# Patient Record
Sex: FEMALE | Race: WHITE | NOT HISPANIC OR LATINO
[De-identification: names, ages, dates, MRNs, and addresses within clinical notes are randomized per-mention and may not be internally consistent; named-entity substitution may affect disease eponyms.]

---

## 2020-01-07 ENCOUNTER — TRANSCRIPTION ENCOUNTER (OUTPATIENT)
Age: 80
End: 2020-01-07

## 2020-01-24 ENCOUNTER — OUTPATIENT (OUTPATIENT)
Dept: OUTPATIENT SERVICES | Facility: HOSPITAL | Age: 80
LOS: 1 days | End: 2020-01-24
Payer: MEDICARE

## 2020-01-24 VITALS
SYSTOLIC BLOOD PRESSURE: 132 MMHG | HEART RATE: 82 BPM | RESPIRATION RATE: 16 BRPM | DIASTOLIC BLOOD PRESSURE: 64 MMHG | OXYGEN SATURATION: 97 % | HEIGHT: 64 IN | WEIGHT: 179.9 LBS | TEMPERATURE: 98 F

## 2020-01-24 DIAGNOSIS — C50.411 MALIGNANT NEOPLASM OF UPPER-OUTER QUADRANT OF RIGHT FEMALE BREAST: ICD-10-CM

## 2020-01-24 DIAGNOSIS — C50.419 MALIGNANT NEOPLASM OF UPPER-OUTER QUADRANT OF UNSPECIFIED FEMALE BREAST: ICD-10-CM

## 2020-01-24 DIAGNOSIS — Z98.41 CATARACT EXTRACTION STATUS, RIGHT EYE: Chronic | ICD-10-CM

## 2020-01-24 DIAGNOSIS — Z90.710 ACQUIRED ABSENCE OF BOTH CERVIX AND UTERUS: Chronic | ICD-10-CM

## 2020-01-24 DIAGNOSIS — C49.A2 GASTROINTESTINAL STROMAL TUMOR OF STOMACH: Chronic | ICD-10-CM

## 2020-01-24 LAB
ANION GAP SERPL CALC-SCNC: 16 MMO/L — HIGH (ref 7–14)
BUN SERPL-MCNC: 22 MG/DL — SIGNIFICANT CHANGE UP (ref 7–23)
CALCIUM SERPL-MCNC: 10.1 MG/DL — SIGNIFICANT CHANGE UP (ref 8.4–10.5)
CHLORIDE SERPL-SCNC: 88 MMOL/L — LOW (ref 98–107)
CO2 SERPL-SCNC: 25 MMOL/L — SIGNIFICANT CHANGE UP (ref 22–31)
CREAT SERPL-MCNC: 1.12 MG/DL — SIGNIFICANT CHANGE UP (ref 0.5–1.3)
GLUCOSE SERPL-MCNC: 68 MG/DL — LOW (ref 70–99)
HCT VFR BLD CALC: 38.8 % — SIGNIFICANT CHANGE UP (ref 34.5–45)
HGB BLD-MCNC: 12.4 G/DL — SIGNIFICANT CHANGE UP (ref 11.5–15.5)
MCHC RBC-ENTMCNC: 28.9 PG — SIGNIFICANT CHANGE UP (ref 27–34)
MCHC RBC-ENTMCNC: 32 % — SIGNIFICANT CHANGE UP (ref 32–36)
MCV RBC AUTO: 90.4 FL — SIGNIFICANT CHANGE UP (ref 80–100)
NRBC # FLD: 0 K/UL — SIGNIFICANT CHANGE UP (ref 0–0)
PLATELET # BLD AUTO: 337 K/UL — SIGNIFICANT CHANGE UP (ref 150–400)
PMV BLD: 9.3 FL — SIGNIFICANT CHANGE UP (ref 7–13)
POTASSIUM SERPL-MCNC: 4 MMOL/L — SIGNIFICANT CHANGE UP (ref 3.5–5.3)
POTASSIUM SERPL-SCNC: 4 MMOL/L — SIGNIFICANT CHANGE UP (ref 3.5–5.3)
RBC # BLD: 4.29 M/UL — SIGNIFICANT CHANGE UP (ref 3.8–5.2)
RBC # FLD: 13 % — SIGNIFICANT CHANGE UP (ref 10.3–14.5)
SODIUM SERPL-SCNC: 129 MMOL/L — LOW (ref 135–145)
WBC # BLD: 5.5 K/UL — SIGNIFICANT CHANGE UP (ref 3.8–10.5)
WBC # FLD AUTO: 5.5 K/UL — SIGNIFICANT CHANGE UP (ref 3.8–10.5)

## 2020-01-24 PROCEDURE — 93010 ELECTROCARDIOGRAM REPORT: CPT

## 2020-01-24 RX ORDER — ASPIRIN/CALCIUM CARB/MAGNESIUM 324 MG
1 TABLET ORAL
Qty: 0 | Refills: 0 | DISCHARGE

## 2020-01-24 RX ORDER — SODIUM CHLORIDE 9 MG/ML
3 INJECTION INTRAMUSCULAR; INTRAVENOUS; SUBCUTANEOUS EVERY 8 HOURS
Refills: 0 | Status: DISCONTINUED | OUTPATIENT
Start: 2020-01-31 | End: 2020-02-17

## 2020-01-24 RX ORDER — SODIUM CHLORIDE 9 MG/ML
1000 INJECTION, SOLUTION INTRAVENOUS
Refills: 0 | Status: DISCONTINUED | OUTPATIENT
Start: 2020-01-31 | End: 2020-02-17

## 2020-01-24 NOTE — H&P PST ADULT - NEGATIVE CARDIOVASCULAR SYMPTOMS
no chest pain/no orthopnea/no paroxysmal nocturnal dyspnea/no palpitations/no dyspnea on exertion/no peripheral edema/no claudication

## 2020-01-24 NOTE — H&P PST ADULT - HISTORY OF PRESENT ILLNESS
abn. mammo / US on 11/2019 showed something. S/P biopsy which showed a spot that's cacerous. S/P MRI showed benign papillomas. Pt was then refereed to surgeon by daughter. 78 y/o  female with PMH: HTN, Dyslipidemia presents to PST for pre op evaluation with hx of abn. mammo / US on 11/2019. S/P biopsy which showed "a spot that's cancerous". S/P MRI showed "benign papillomas", as per daughter. Pt was then referred to surgeon. Now scheduled for right Sono Thais  lumpectomy right sentinel lymph node biospy, excision of papilloma on 01/31/2020

## 2020-01-24 NOTE — H&P PST ADULT - ASSESSMENT
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

## 2020-01-24 NOTE — H&P PST ADULT - NEGATIVE GENERAL SYMPTOMS
no anorexia/no weight loss/no fatigue/no chills/no sweating/no weight gain/no polyphagia/no fever no weight gain/no polyphagia/no weight loss/no fever/no sweating/no anorexia/no malaise/no fatigue/no chills

## 2020-01-24 NOTE — H&P PST ADULT - NEGATIVE GASTROINTESTINAL SYMPTOMS
no hiccoughs/no melena/no vomiting/no nausea/no diarrhea/no change in bowel habits/no jaundice/no abdominal pain

## 2020-01-24 NOTE — H&P PST ADULT - NEGATIVE OPHTHALMOLOGIC SYMPTOMS
no diplopia/no discharge L/no discharge R/no photophobia no lacrimation L/no lacrimation R/no diplopia/no blurred vision L/no discharge L/no pain L/no irritation L/no scleral injection R/no discharge R/no photophobia/no blurred vision R/no pain R/no irritation R/no loss of vision L/no scleral injection L

## 2020-01-24 NOTE — H&P PST ADULT - NEGATIVE BREAST SYMPTOMS
no breast tenderness L no breast tenderness L/no breast tenderness R/no nipple discharge L/no nipple discharge R

## 2020-01-24 NOTE — H&P PST ADULT - RS GEN PE MLT RESP DETAILS PC
clear to auscultation bilaterally/no intercostal retractions/no rales/no subcutaneous emphysema/no rhonchi/no wheezes/airway patent/breath sounds equal/respirations non-labored/good air movement/no chest wall tenderness

## 2020-01-24 NOTE — H&P PST ADULT - NEGATIVE GENERAL GENITOURINARY SYMPTOMS
no hematuria/no flank pain R/no bladder infections/no urine discoloration/no dysuria/no gas in urine/normal urinary frequency/no renal colic/no flank pain L

## 2020-01-24 NOTE — H&P PST ADULT - NEGATIVE NEUROLOGICAL SYMPTOMS
no hemiparesis/no facial palsy/no transient paralysis/no weakness/no generalized seizures/no tremors/no vertigo/no loss of sensation/no headache/no paresthesias/no difficulty walking/no loss of consciousness/no confusion

## 2020-01-24 NOTE — H&P PST ADULT - NEGATIVE ENMT SYMPTOMS
no nasal congestion/no nasal obstruction/no post-nasal discharge/no nose bleeds/no abnormal taste sensation/no dry mouth/no vertigo/no sinus symptoms/no ear pain/no recurrent cold sores/no tinnitus/no throat pain/no dysphagia/no hearing difficulty

## 2020-01-24 NOTE — H&P PST ADULT - NSICDXPASTSURGICALHX_GEN_ALL_CORE_FT
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

## 2020-01-24 NOTE — H&P PST ADULT - NSICDXPASTMEDICALHX_GEN_ALL_CORE_FT
PAST MEDICAL HISTORY:  2010  endometrial malignancy/Cancer     Abdominal Mass     Fibroid     History of Colonoscopy     Hypercholesterolemia     Hypertension     S/P Endoscopy

## 2020-01-24 NOTE — H&P PST ADULT - BREASTS DETAILS
no discharge or discoloration/mass R/nipples normal/no tenderness/right breast ecchymotic areas noted

## 2020-01-24 NOTE — H&P PST ADULT - NSICDXPROBLEM_GEN_ALL_CORE_FT
PROBLEM DIAGNOSES  Problem: Malignant neoplasm of upper-outer quadrant of female breast  Assessment and Plan: Scheduled for right Sono Saviscout lumpectomy, right sentinel lymph node biospy, excision of papilloma on 01/31/2020. Pre op instructions, famotidine, chlorhexidine gluconate soap given and explained,   Pending medical consultation pre op

## 2020-01-29 ENCOUNTER — APPOINTMENT (OUTPATIENT)
Dept: MAMMOGRAPHY | Facility: IMAGING CENTER | Age: 80
End: 2020-01-29
Payer: MEDICARE

## 2020-01-29 ENCOUNTER — OUTPATIENT (OUTPATIENT)
Dept: OUTPATIENT SERVICES | Facility: HOSPITAL | Age: 80
LOS: 1 days | End: 2020-01-29
Payer: MEDICARE

## 2020-01-29 ENCOUNTER — APPOINTMENT (OUTPATIENT)
Dept: ULTRASOUND IMAGING | Facility: IMAGING CENTER | Age: 80
End: 2020-01-29
Payer: MEDICARE

## 2020-01-29 DIAGNOSIS — Z00.8 ENCOUNTER FOR OTHER GENERAL EXAMINATION: ICD-10-CM

## 2020-01-29 DIAGNOSIS — Z90.710 ACQUIRED ABSENCE OF BOTH CERVIX AND UTERUS: Chronic | ICD-10-CM

## 2020-01-29 DIAGNOSIS — C49.A2 GASTROINTESTINAL STROMAL TUMOR OF STOMACH: Chronic | ICD-10-CM

## 2020-01-29 DIAGNOSIS — Z98.41 CATARACT EXTRACTION STATUS, RIGHT EYE: Chronic | ICD-10-CM

## 2020-01-29 PROCEDURE — 19282 PERQ DEVICE BREAST EA IMAG: CPT | Mod: RT

## 2020-01-29 PROCEDURE — 19285 PERQ DEV BREAST 1ST US IMAG: CPT | Mod: 59,RT

## 2020-01-29 PROCEDURE — 19285 PERQ DEV BREAST 1ST US IMAG: CPT

## 2020-01-29 PROCEDURE — 19281 PERQ DEVICE BREAST 1ST IMAG: CPT | Mod: 59,RT

## 2020-01-29 PROCEDURE — 19282 PERQ DEVICE BREAST EA IMAG: CPT

## 2020-01-29 PROCEDURE — C1739: CPT

## 2020-01-29 PROCEDURE — 19281 PERQ DEVICE BREAST 1ST IMAG: CPT

## 2020-01-30 ENCOUNTER — TRANSCRIPTION ENCOUNTER (OUTPATIENT)
Age: 80
End: 2020-01-30

## 2020-01-31 ENCOUNTER — APPOINTMENT (OUTPATIENT)
Dept: NUCLEAR MEDICINE | Facility: HOSPITAL | Age: 80
End: 2020-01-31

## 2020-01-31 ENCOUNTER — RESULT REVIEW (OUTPATIENT)
Age: 80
End: 2020-01-31

## 2020-01-31 ENCOUNTER — OUTPATIENT (OUTPATIENT)
Dept: OUTPATIENT SERVICES | Facility: HOSPITAL | Age: 80
LOS: 1 days | Discharge: HOME | End: 2020-01-31
Payer: MEDICARE

## 2020-01-31 VITALS
HEART RATE: 82 BPM | RESPIRATION RATE: 16 BRPM | WEIGHT: 179.9 LBS | DIASTOLIC BLOOD PRESSURE: 64 MMHG | SYSTOLIC BLOOD PRESSURE: 132 MMHG | OXYGEN SATURATION: 97 % | TEMPERATURE: 98 F | HEIGHT: 64 IN

## 2020-01-31 VITALS
RESPIRATION RATE: 15 BRPM | SYSTOLIC BLOOD PRESSURE: 120 MMHG | DIASTOLIC BLOOD PRESSURE: 59 MMHG | OXYGEN SATURATION: 97 % | HEART RATE: 79 BPM

## 2020-01-31 DIAGNOSIS — Z90.710 ACQUIRED ABSENCE OF BOTH CERVIX AND UTERUS: Chronic | ICD-10-CM

## 2020-01-31 DIAGNOSIS — Z98.41 CATARACT EXTRACTION STATUS, RIGHT EYE: Chronic | ICD-10-CM

## 2020-01-31 DIAGNOSIS — C50.411 MALIGNANT NEOPLASM OF UPPER-OUTER QUADRANT OF RIGHT FEMALE BREAST: ICD-10-CM

## 2020-01-31 DIAGNOSIS — C49.A2 GASTROINTESTINAL STROMAL TUMOR OF STOMACH: Chronic | ICD-10-CM

## 2020-01-31 PROCEDURE — 88307 TISSUE EXAM BY PATHOLOGIST: CPT | Mod: 26

## 2020-01-31 PROCEDURE — 88305 TISSUE EXAM BY PATHOLOGIST: CPT | Mod: 26

## 2020-01-31 PROCEDURE — 88331 PATH CONSLTJ SURG 1 BLK 1SPC: CPT | Mod: 26

## 2020-01-31 PROCEDURE — 76098 X-RAY EXAM SURGICAL SPECIMEN: CPT | Mod: 26,76

## 2020-01-31 PROCEDURE — 88309 TISSUE EXAM BY PATHOLOGIST: CPT | Mod: 26

## 2020-01-31 RX ORDER — ROSUVASTATIN CALCIUM 5 MG/1
0 TABLET ORAL
Qty: 0 | Refills: 0 | DISCHARGE

## 2020-01-31 RX ORDER — MULTIVIT-MIN/FERROUS GLUCONATE 9 MG/15 ML
1 LIQUID (ML) ORAL
Qty: 0 | Refills: 0 | DISCHARGE

## 2020-01-31 RX ORDER — CYCLOSPORINE 0.5 MG/ML
1 EMULSION OPHTHALMIC
Qty: 0 | Refills: 0 | DISCHARGE

## 2020-01-31 RX ORDER — FENTANYL CITRATE 50 UG/ML
25 INJECTION INTRAVENOUS
Refills: 0 | Status: DISCONTINUED | OUTPATIENT
Start: 2020-01-31 | End: 2020-01-31

## 2020-01-31 RX ORDER — ONDANSETRON 8 MG/1
4 TABLET, FILM COATED ORAL ONCE
Refills: 0 | Status: DISCONTINUED | OUTPATIENT
Start: 2020-01-31 | End: 2020-02-17

## 2020-01-31 RX ORDER — TELMISARTAN 20 MG/1
1 TABLET ORAL
Qty: 0 | Refills: 0 | DISCHARGE

## 2020-01-31 RX ORDER — ASPIRIN/CALCIUM CARB/MAGNESIUM 324 MG
1 TABLET ORAL
Qty: 0 | Refills: 0 | DISCHARGE

## 2020-01-31 RX ORDER — FENTANYL CITRATE 50 UG/ML
50 INJECTION INTRAVENOUS
Refills: 0 | Status: DISCONTINUED | OUTPATIENT
Start: 2020-01-31 | End: 2020-01-31

## 2020-01-31 NOTE — BRIEF OPERATIVE NOTE - NSICDXBRIEFPOSTOP_GEN_ALL_CORE_FT
POST-OP DIAGNOSIS:  Papilloma of right breast 31-Jan-2020 13:37:12  Miguel Angel Snowden Jr.  Ductal carcinoma in situ (DCIS) of right breast 31-Jan-2020 13:37:00  Miguel Angel Snowden Jr.

## 2020-01-31 NOTE — ASU DISCHARGE PLAN (ADULT/PEDIATRIC) - MEDICATION INSTRUCTIONS
You received IV Tylenol for pain management at 12 PM. Please DO NOT take any Tylenol (Acetaminophen) containing products, such as Vicodin, Percocet, Excedrin, and cold medications for the next 6 hours (until 6 PM). DO NOT TAKE MORE THAN 3000 MG OF TYLENOL in a 24 hour period.

## 2020-01-31 NOTE — ASU DISCHARGE PLAN (ADULT/PEDIATRIC) - CARE PROVIDER_API CALL
Rachel Brown)  Surgery  [address] [facility] [address], [address]  [place] [address]  Phone: [phone]  Fax: [phone]  Follow Up Time: 2 weeks

## 2020-01-31 NOTE — ASU DISCHARGE PLAN (ADULT/PEDIATRIC) - CALL YOUR DOCTOR IF YOU HAVE ANY OF THE FOLLOWING:
Bleeding that does not stop/Pain not relieved by Medications Unable to urinate/Bleeding that does not stop/Wound/Surgical Site with redness, or foul smelling discharge or pus/Pain not relieved by Medications/Swelling that gets worse/Numbness, tingling, color or temperature change to extremity/Inability to tolerate liquids or foods

## 2020-01-31 NOTE — ASU DISCHARGE PLAN (ADULT/PEDIATRIC) - ASU DC SPECIAL INSTRUCTIONSFT
Breast Biopsy/Lumpectomy Post-operative Instructions  1.	Supportive bra- Please bring a sports/athletic bra with you on the day of your surgery.  You will wear it home from the hospital.  You should wear the supportive bra continuously for 48 hours after the procedure, including wearing it to sleep.  Therefore, you may wear your regular bra.  You may remove the bra to shower.  The sports bra will provide support, decrease the amount of swelling at the biopsy site, and make your recovery more comfortable.    2.	Wound dressing- There is a dressing on the wound, which consists of 2 layers.  The outer layer is a clear plastic dressing (called Tegaderm) which is waterproof.  This should remain in place for 1 days post-operatively.  On the 1st post-operative day, you should remove outer dressing, there are white surgical tapes (called steri strips) which are directly adherent to the skin.  These should remain on the skin, and will peel off naturally.  All of the stitches are “internal” and will dissolve naturally.    3.	Showering/Bathing- You may shower over the dressing the very next day after surgery.  Allow the water to run over the dressing, but don not scrub the area.  It is best not to sit in a bathtub or swimming pool for at least one week after surgery.    4.	Activity level- You may resume most normal daily activity as tolerated, but avoid strenuous activities such as aerobics, jogging, exercising or heavy lifting for at least 1 week after surgery.  You may return to work in 1-2 days after surgery.  You may drive as long as you are not taking any prescription pain medication.    5.	Pain Medication- You may take the prescribed medication, or you may take extra-strength Tylenol as needed.  Please don to take aspirin, Motrin, Advil or any other anti-inflammatory medications, as these medications may cause bleeding or bruising.    6.	Follow-up Appointment- Please call the office to schedule your post-operative appointment which should be approximately 10 days after your surgery. Office [phone]    7.	Bruising/Bleeding/Swelling- It is normal for there to be some bruising and swelling at the breast biopsy site, and there may be some staining of blood on to the dressing.  Some discomfort at the surgical site is expected.  If your symptoms seem excessive, or if you have any question or concerns, please call the office.      Anesthesia Precautions:  For the next 12 hours do not:   •	drive a car,  •	drink alcohol, beer, or wine,   •	make important personal or business decisions  Diet:   •	Progress diet slowly unless otherwise indicated    Physician Notification  •	Any Prescription issues  •	Bleeding that does not stop  •	Persistent nausea and vomiting  •	Pain not relieved by medications  •	Fever greater than 101®F  •	Inability to tolerate liquids or foods  •	Unable to urinate after 8 hours  Discharge and Disposition  •	Discharge to home/ group home/assisted living  •	Accompanied by Family/Spouse/ Parents/ Significant Other/ Friend/ and or Caregiver    Follow Up Care:  •	In the event that you develop a complication and you are unable to reach your own physician, you may contact:  911 or go to the nearest Emergency Room.   •	Please call your surgeon to schedule your follow up appointment [phone]

## 2020-01-31 NOTE — BRIEF OPERATIVE NOTE - NSICDXBRIEFPROCEDURE_GEN_ALL_CORE_FT
PROCEDURES:  Excision of intraductal papilloma of breast 31-Jan-2020 13:36:20  Miguel Angel Snowden Jr.  Right breast lumpectomy 31-Jan-2020 13:35:51  Miguel Angel Snowden Jr.

## 2020-01-31 NOTE — ASU DISCHARGE PLAN (ADULT/PEDIATRIC) - FOLLOW UP APPOINTMENTS
911 or go to the nearest Emergency Room may also call Recovery Room (PACU) 24/7 @ [phone]/[facility]:

## 2020-07-12 ENCOUNTER — TRANSCRIPTION ENCOUNTER (OUTPATIENT)
Age: 80
End: 2020-07-12

## 2021-06-05 ENCOUNTER — EMERGENCY (EMERGENCY)
Facility: HOSPITAL | Age: 81
LOS: 1 days | Discharge: HOME | End: 2021-06-05
Attending: EMERGENCY MEDICINE
Payer: MEDICARE

## 2021-06-05 VITALS
HEIGHT: 64 IN | HEART RATE: 99 BPM | TEMPERATURE: 98 F | WEIGHT: 164.91 LBS | SYSTOLIC BLOOD PRESSURE: 170 MMHG | RESPIRATION RATE: 18 BRPM | OXYGEN SATURATION: 98 % | DIASTOLIC BLOOD PRESSURE: 75 MMHG

## 2021-06-05 VITALS
HEART RATE: 76 BPM | DIASTOLIC BLOOD PRESSURE: 69 MMHG | SYSTOLIC BLOOD PRESSURE: 146 MMHG | RESPIRATION RATE: 18 BRPM | OXYGEN SATURATION: 98 %

## 2021-06-05 DIAGNOSIS — C49.A2 GASTROINTESTINAL STROMAL TUMOR OF STOMACH: Chronic | ICD-10-CM

## 2021-06-05 DIAGNOSIS — Z90.710 ACQUIRED ABSENCE OF BOTH CERVIX AND UTERUS: Chronic | ICD-10-CM

## 2021-06-05 DIAGNOSIS — Z98.41 CATARACT EXTRACTION STATUS, RIGHT EYE: Chronic | ICD-10-CM

## 2021-06-05 PROCEDURE — 70450 CT HEAD/BRAIN W/O DYE: CPT | Mod: 26,MA

## 2021-06-05 PROCEDURE — 73080 X-RAY EXAM OF ELBOW: CPT | Mod: 26,RT

## 2021-06-05 PROCEDURE — 90715 TDAP VACCINE 7 YRS/> IM: CPT

## 2021-06-05 PROCEDURE — 90471 IMMUNIZATION ADMIN: CPT

## 2021-06-05 PROCEDURE — 99284 EMERGENCY DEPT VISIT MOD MDM: CPT | Mod: GC

## 2021-06-05 PROCEDURE — 99284 EMERGENCY DEPT VISIT MOD MDM: CPT | Mod: 25

## 2021-06-05 PROCEDURE — 73080 X-RAY EXAM OF ELBOW: CPT

## 2021-06-05 PROCEDURE — 70450 CT HEAD/BRAIN W/O DYE: CPT

## 2021-06-05 RX ORDER — TETANUS TOXOID, REDUCED DIPHTHERIA TOXOID AND ACELLULAR PERTUSSIS VACCINE, ADSORBED 5; 2.5; 8; 8; 2.5 [IU]/.5ML; [IU]/.5ML; UG/.5ML; UG/.5ML; UG/.5ML
0.5 SUSPENSION INTRAMUSCULAR ONCE
Refills: 0 | Status: COMPLETED | OUTPATIENT
Start: 2021-06-05 | End: 2021-06-05

## 2021-06-05 RX ORDER — ACETAMINOPHEN 500 MG
650 TABLET ORAL ONCE
Refills: 0 | Status: COMPLETED | OUTPATIENT
Start: 2021-06-05 | End: 2021-06-05

## 2021-06-05 RX ADMIN — Medication 650 MILLIGRAM(S): at 11:25

## 2021-06-05 RX ADMIN — TETANUS TOXOID, REDUCED DIPHTHERIA TOXOID AND ACELLULAR PERTUSSIS VACCINE, ADSORBED 0.5 MILLILITER(S): 5; 2.5; 8; 8; 2.5 SUSPENSION INTRAMUSCULAR at 11:25

## 2021-06-05 NOTE — ED PROVIDER NOTE - PHYSICAL EXAMINATION
Physical Exam:  Gen: NAD, AOx3, non-toxic appearing  Head: normal appearing with right frontal hematoma 3cm in diameter with skin abrasions overlying, no laceration present   HEENT: normal conjunctiva, oral mucosa moist, eomi, peerla, normal external ears no christopher sign or periocular ecchymosis, va grossly intact   Lung:  no respiratory distress, speaking in full sentences, clear to ascultation bilaterally     CV: regular rate and rhythm, no chest wall ttp   Abd: soft, ND, NT  MSK: no visible deformities, pelvis stable, FROM bilateral UE's and LE's, scattered abrasions to right elbow, bilateral hands palmar surface,   Neuro: No focal deficits  Skin: Warm  Psych: normal affect  ~Connor Patterson D.O. -Resident

## 2021-06-05 NOTE — ED PROVIDER NOTE - CLINICAL SUMMARY MEDICAL DECISION MAKING FREE TEXT BOX
80yo f pmhx htn, hld, on daily aspirin pw daughter for mechanical fall today. scattered abrasions, right elbow large abrasion but with FROM, right frontal hematoma but otherwise able to walk no spinal ttp, well appearing vs wnl, will give analgesia, local wound care, ct head and xray right elbow dc with daughter for pmd Follow up 80yo f pmhx htn, hld, on daily aspirin pw daughter for mechanical fall today. scattered abrasions, right elbow large abrasion but with FROM, right frontal hematoma but otherwise able to walk no spinal ttp, well appearing vs wnl, will give analgesia, local wound care, ct head and xray right elbow dc with daughter for pmd Follow up    Tony: 81 year old female with htn, hld, on daily asa here with mechanical fall. + hematoma to right frontal head. no midline c-spine/t-spine/l-spine tenderness, pelvis stable. 5/5 b/l ue/le. FROM of b/l ue. + abrasion to right elbow and left volar hand. will update tetanus, ct head, xray right elbow, reassess.

## 2021-06-05 NOTE — ED PROVIDER NOTE - CARE PLAN
Principal Discharge DX:	CHI (closed head injury), initial encounter  Secondary Diagnosis:	Abrasions of multiple sites

## 2021-06-05 NOTE — ED PROVIDER NOTE - OBJECTIVE STATEMENT
80yo f pmhx htn, hld, on daily aspirin pw daughter for mechanical fall today. reports having been walking outside of [employer] after having her hair done and tripping on curb with flip flops forward onto concrete, fell onto right side of face and right elbow. no loc, no other ac besides aspirin. no n/v after fall able to ambulate by herself after fall. Denies other recent trauma, fevers, chills, headache, dizziness, nausea, vomiting, dysuria, freq, hematuria, diarrhea, constipation, chest pain, shortness of breath, cough. unk last tetanus

## 2021-06-05 NOTE — ED PROVIDER NOTE - PROGRESS NOTE DETAILS
ct head no ICH. advised to f/u with pmd. return precuations given for nausea/vomiting, worsening headache. ambulating with no difficulty. will d/c home.

## 2021-06-05 NOTE — ED ADULT NURSE NOTE - OBJECTIVE STATEMENT
82 yo female A&OX4 BIB EMS s/p mechanical fall. Pt st she rolled her ankle while walking into salon, falling hitting her head. PT denies LOC, neck/back pain. PERRL. Pt has hematoma to right forehead, lac to right elbow. Pt is on ASA. Pt denies dizziness/lightheadedness. Pt denies chx pain, SOB, LOC, abd pn, n/v/d/dizziness. VS stable, CT pending.

## 2021-06-05 NOTE — ED PROVIDER NOTE - NSFOLLOWUPINSTRUCTIONS_ED_ALL_ED_FT
1. TAKE ALL MEDICATIONS AS DIRECTED.    2. FOR PAIN OR FEVER YOU CAN TAKE IBUPROFEN (MOTRIN, ADVIL) OR ACETAMINOPHEN (TYLENOL) AS NEEDED, AS DIRECTED ON PACKAGING.  3. FOLLOW UP WITH YOUR PRIMARY DOCTOR WITHIN 5 DAYS AS DIRECTED.  4. IF YOU HAD LABS OR IMAGING DONE, YOU WERE GIVEN COPIES OF ALL LABS AND/OR IMAGING RESULTS FROM YOUR ER VISIT--PLEASE TAKE THEM WITH YOU TO YOUR FOLLOW UP APPOINTMENTS.  5. RETURN TO THE ER FOR ANY WORSENING SYMPTOMS OR CONCERNS.    Closed Head Injury    A closed head injury is an injury to your head that may or may not involve a traumatic brain injury (TBI). Symptoms of TBI can be short or long lasting and include headache, dizziness, interference with memory or speech, fatigue, confusion, changes in sleep, mood changes, nausea, depression/anxiety, and dulling of senses. Make sure to obtain proper rest which includes getting plenty of sleep, avoiding excessive visual stimulation, and avoiding activities that may cause physical or mental stress. Avoid any situation where there is potential for another head injury, including sports.    SEEK IMMEDIATE MEDICAL CARE IF YOU HAVE ANY OF THE FOLLOWING SYMPTOMS: unusual drowsiness, vomiting, severe dizziness, seizures, lightheadedness, muscular weakness, different pupil sizes, visual changes, or clear or bloody discharge from your ears or nose.    Laceration     A laceration is a cut that goes through all of the layers of the skin and into the tissue that is right under the skin. Some lacerations heal on their own. Others need to be closed with skin adhesive strips, skin glue, stitches (sutures), or staples. Proper laceration care minimizes the risk of infection and helps the laceration to heal better.  If non-absorbable stitches or staples have been placed, they must be taken out within the time frame instructed by your healthcare provider.    SEEK IMMEDIATE MEDICAL CARE IF YOU HAVE ANY OF THE FOLLOWING SYMPTOMS: swelling around the wound, worsening pain, drainage from the wound, red streaking going away from your wound, inability to move finger or toe near the laceration, or discoloration of skin near the laceration.    Sutures, Staples, or Adhesive Wound Closure    Doctors use stitches (sutures), staples, and glue (skin adhesives) to hold your skin together while it heals (wound closure). What your doctor uses depends on your wound.     In most cases, your wound will be closed right away (primary skin closure). Sometimes it may be closed later so that it can be cleaned and then heal naturally (delayed wound closure).    What are the types of wound closure?    Adhesive glue     To use adhesive glue, your doctor: Holds the edges of the wound together, Paints the glue onto your skin. You may need more than one layer, Covers your wound with a bandage (dressing) after the glue is dry  Adhesive glue may be used for: Wounds that are not deep (superficial wounds), Wounds on the face, Children's wounds.  Adhesive glue is not used inside of wounds, or on wounds that are: Deep. Uneven. Bleeding.   Some benefits of adhesive glue are: It leaves nothing that needs to be removed, You do not need medicine to numb the area, You have less pain than with other types of closure, Adhesive strips   Adhesive strips are: Made of paper that is sticky (adhesive) and has many small holes it in (porous), Placed across your wound edges, like a normal bandage, Used to close very shallow wounds, Sometimes used with sutures to help improve closure.    Sutures    Sutures come in many different materials, strengths, and sizes. Some sutures break down as your wound heals (absorbable). Other sutures need to be removed (nonabsorbable).  To use sutures, your doctor: Sews your skin together with sutures and a needle. May use one long (continuous) stitch or separate stitches. Ties and cuts the sutures at the end.   Sutures can be used for all types of wounds, including under the skin. They can cause a skin reaction that can lead to infection.    Staples    Staples are often used to close surgical cuts (incisions). To use staples, your doctor: Holds the edges of your wound close together. Places a staple across the wound. Uses a tool to secure the staple to the skin. Repeats this with as many staples as needed.  Staples are faster to use than sutures, and they cause less reaction from your skin. Staples need to be removed using a tool that bends the staples away from your skin.    Follow these instructions at home:    Medicines     Take over-the-counter and prescription medicines only as told by your doctor.  If you were prescribed an antibiotic medicine, take it as told by your doctor. Do not stop taking the antibiotic even if you start to feel better    Wound care     Follow instructions from your doctor about how to take care of your wound and bandage.    Wash your hands with soap and water before and after touching your wound or bandage. If you cannot use soap and water, use hand .  Do not try to remove your wound closures unless your doctor tells you to do that. You may need a follow-up visit for your doctor to remove your closures.  Closures may stay in place for 2 weeks or longer.  Absorbable sutures may break down after a few days or weeks.  If adhesive strip edges start to loosen and curl up, you may trim the loose edges.  Do not pick at your wound. Picking can cause an infection.  Apply ointments or creams only as told by your doctor.    Check your wound every day for signs of infection. Check for:  Redness, swelling, or pain.  Fluid or blood.  Warmth.  Pus or a bad smell.    General instructions     You may take showers with soap and water.   Do not take baths, swim, or use a hot tub until your doctor approves.   Do not soak your wound in water.  Keep all follow-up visits as told by your doctor. This is important.    Contact a doctor if you have any of the following:  A fever.   Chills.   Redness, swelling, or pain around your wound.  Fluid or blood coming from your wound.  Pus or a bad smell coming from your wound.  Notice that your wound feels warm to the touch.  Notice that the edges of your wound start to separate after your sutures come out.  Notice that your wound becomes thick, raised, and darker in color after your sutures come out (scarring).    Summary    What your doctor uses to hold your skin together while it heals (wound closure) depends on your wound.  Your doctor may use stitches (sutures), staples, and glue (skin adhesives).  Do not try to remove your wound closures unless your doctor tells you to do that.  Do not soak your wound in water.  This information is not intended to replace advice given to you by your health care provider.   Make sure you discuss any questions you have with your health care provider.

## 2021-06-05 NOTE — ED PROVIDER NOTE - PATIENT PORTAL LINK FT
You can access the FollowMyHealth Patient Portal offered by [facility] by registering at the following website: [email or web address]. By joining [facility] [facility]’s FollowMyHealth portal, you will also be able to view your health information using other applications (apps) compatible with our system.

## 2021-06-05 NOTE — ED PROVIDER NOTE - NS ED ROS FT

## 2021-06-05 NOTE — ED ADULT NURSE NOTE - NSIMPLEMENTINTERV_GEN_ALL_ED
Implemented All Fall Risk Interventions:  [place] to call system. Call bell, personal items and telephone within reach. Instruct patient to call for assistance. Room bathroom lighting operational. Non-slip footwear when patient is off stretcher. Physically safe environment: no spills, clutter or unnecessary equipment. Stretcher in lowest position, wheels locked, appropriate side rails in place. Provide visual cue, wrist band, yellow gown, etc. Monitor gait and stability. Monitor for mental status changes and reorient to person, place, and time. Review medications for side effects contributing to fall risk. Reinforce activity limits and safety measures with patient and family.

## 2021-06-05 NOTE — ED PROVIDER NOTE - PSH
Carpal Tunnel Release---left hand  1996    excision of skin melanoma from back 16 years ago/Cancer    Gastrointestinal stromal tumor (GIST) of stomach  surgery;  2010  H/O abdominal hysterectomy    H/O bilateral cataract extraction  Right 11/18/13, left 12/02/13  S/P Cholecystectomy--1998

## 2021-06-05 NOTE — ED PROVIDER NOTE - PMH
2010  endometrial malignancy/Cancer    Abdominal Mass    Fibroid    History of Colonoscopy    Hypercholesterolemia    Hypertension    S/P Endoscopy

## 2022-07-25 ENCOUNTER — NON-APPOINTMENT (OUTPATIENT)
Age: 82
End: 2022-07-25

## 2022-08-17 NOTE — H&P PST ADULT - NS SC CAGE ALCOHOL EYE OPENER
Wound care supply order sent to [facility]                                                           Wound 08/17/22 L anterior LE (Active)   Wound Image    08/17/22 1500   Site Assessment Yellow;[place] 08/17/22 1500   Periwound Assessment Scar tissue 08/17/22 1500   Margins Attached edges 08/17/22 1500   Closure Secondary intention 08/17/22 1500   Drainage Amount Scant 08/17/22 1500   Drainage Description Serosanguineous 08/17/22 1500   Treatments Cleansed;Topical Lidocaine;Provider debridement 08/17/22 1500   Wound Cleansing Foam Cleanser/Washcloth 08/17/22 1500   Periwound Protectant Barrier Paste;Skin Protectant Wipes to Periwound 08/17/22 1500   Dressing Cleansing/Solutions Not Applicable 08/17/22 1500   Dressing Options Hydrofiber Silver;Silicone Adhesive Foam;Tubigrip 08/17/22 1500   Non-staged Wound Description Full thickness 08/17/22 1500   Wound Length (cm) 1.9 cm 08/17/22 1500   Wound Width (cm) 1.4 cm 08/17/22 1500   Wound Depth (cm) 0.4 cm 08/17/22 1500   Wound Surface Area (cm^2) 2.66 cm^2 08/17/22 1500   Wound Volume (cm^3) 1.064 cm^3 08/17/22 1500   Post-Procedure Length (cm) 1.9 cm 08/17/22 1500   Post-Procedure Width (cm) 1.5 cm 08/17/22 1500   Post-Procedure Depth (cm) 0.4 cm 08/17/22 1500   Post-Procedure Surface Area (cm^2) 2.85 cm^2 08/17/22 1500   Post-Procedure Volume (cm^3) 1.14 cm^3 08/17/22 1500   Wound Bed Granulation (%) - Post-Procedure 100 % 08/17/22 1500   Tunneling (cm) 0 cm 08/17/22 1500   Undermining (cm) 0 cm 08/17/22 1500   Wound Odor Other (Comments) 08/17/22 1500   Pulses Left;Doppler;DP;PT;2+ 08/17/22 1500   Exposed Structures None 08/17/22 1500           no

## 2023-01-06 ENCOUNTER — APPOINTMENT (OUTPATIENT)
Dept: ORTHOPEDIC SURGERY | Facility: CLINIC | Age: 83
End: 2023-01-06
Payer: MEDICARE

## 2023-01-06 VITALS — WEIGHT: 181 LBS | HEIGHT: 65 IN | BODY MASS INDEX: 30.16 KG/M2

## 2023-01-06 DIAGNOSIS — E78.00 PURE HYPERCHOLESTEROLEMIA, UNSPECIFIED: ICD-10-CM

## 2023-01-06 DIAGNOSIS — I10 ESSENTIAL (PRIMARY) HYPERTENSION: ICD-10-CM

## 2023-01-06 DIAGNOSIS — S93.401A SPRAIN OF UNSPECIFIED LIGAMENT OF RIGHT ANKLE, INITIAL ENCOUNTER: ICD-10-CM

## 2023-01-06 DIAGNOSIS — Z00.00 ENCOUNTER FOR GENERAL ADULT MEDICAL EXAMINATION W/OUT ABNORMAL FINDINGS: ICD-10-CM

## 2023-01-06 PROCEDURE — 99214 OFFICE O/P EST MOD 30 MIN: CPT

## 2023-01-06 PROCEDURE — 73610 X-RAY EXAM OF ANKLE: CPT | Mod: RT

## 2023-01-06 NOTE — PHYSICAL EXAM
[NL (40)] : plantar flexion 40 degrees [NL 30)] : inversion 30 degrees [NL (20)] : eversion 20 degrees [5___] : Count includes the [facility] 5[unfilled]/5 [2+] : posterior tibialis pulse: 2+ [Normal] : saphenous nerve sensation normal [Right] : right ankle [Weight -] : weightbearing [] : mildly antalgic [FreeTextEntry9] : Medial and lateral talar OCD.  [TWNoteComboBox7] : dorsiflexion 15 degrees

## 2023-01-06 NOTE — HISTORY OF PRESENT ILLNESS
[de-identified] : Pt. is an 82 year old female who presents for evaluation of her LT ankle. Reports twisting injury 12/25/22. She has been wearing a compression support. WB in sneakers today. Ice to affected area. No previous injury/problem with LT ankle.  [] : Post Surgical Visit: no [FreeTextEntry1] : L ankle

## 2023-01-11 ENCOUNTER — APPOINTMENT (OUTPATIENT)
Dept: ORTHOPEDIC SURGERY | Facility: CLINIC | Age: 83
End: 2023-01-11

## 2023-01-17 ENCOUNTER — NON-APPOINTMENT (OUTPATIENT)
Age: 83
End: 2023-01-17

## 2023-01-23 ENCOUNTER — APPOINTMENT (OUTPATIENT)
Dept: ORTHOPEDIC SURGERY | Facility: CLINIC | Age: 83
End: 2023-01-23

## 2023-09-12 ENCOUNTER — APPOINTMENT (OUTPATIENT)
Dept: OTOLARYNGOLOGY | Facility: CLINIC | Age: 83
End: 2023-09-12
Payer: MEDICARE

## 2023-09-12 VITALS — WEIGHT: 180 LBS | BODY MASS INDEX: 29.99 KG/M2 | HEIGHT: 65 IN

## 2023-09-12 DIAGNOSIS — H90.3 SENSORINEURAL HEARING LOSS, BILATERAL: ICD-10-CM

## 2023-09-12 PROCEDURE — 99203 OFFICE O/P NEW LOW 30 MIN: CPT

## 2023-09-12 PROCEDURE — 92557 COMPREHENSIVE HEARING TEST: CPT

## 2023-09-12 PROCEDURE — 92567 TYMPANOMETRY: CPT

## 2023-09-12 PROCEDURE — 92504 EAR MICROSCOPY EXAMINATION: CPT

## 2023-09-12 RX ORDER — ROSUVASTATIN CALCIUM 5 MG/1
TABLET, FILM COATED ORAL
Refills: 0 | Status: ACTIVE | COMMUNITY

## 2023-09-12 RX ORDER — HYDROCHLOROTHIAZIDE 12.5 MG/1
TABLET ORAL
Refills: 0 | Status: ACTIVE | COMMUNITY

## 2023-09-12 RX ORDER — TELMISARTAN 20 MG/1
TABLET ORAL
Refills: 0 | Status: ACTIVE | COMMUNITY

## 2023-09-12 RX ORDER — PANTOPRAZOLE SODIUM 40 MG/1
GRANULE, DELAYED RELEASE ORAL
Refills: 0 | Status: ACTIVE | COMMUNITY

## 2023-09-12 RX ORDER — ASPIRIN 325 MG/1
TABLET, FILM COATED ORAL
Refills: 0 | Status: ACTIVE | COMMUNITY

## 2023-09-12 RX ORDER — MULTIVIT-MINERALS/FOLIC ACID 80 MCG
TABLET,CHEWABLE ORAL
Refills: 0 | Status: ACTIVE | COMMUNITY

## 2023-09-25 ENCOUNTER — APPOINTMENT (OUTPATIENT)
Dept: PHARMACY | Facility: CLINIC | Age: 83
End: 2023-09-25
Payer: SELF-PAY

## 2023-09-25 PROCEDURE — V5010 ASSESSMENT FOR HEARING AID: CPT

## 2023-10-17 ENCOUNTER — APPOINTMENT (OUTPATIENT)
Dept: PHARMACY | Facility: CLINIC | Age: 83
End: 2023-10-17
Payer: SELF-PAY

## 2023-10-17 PROCEDURE — V5261F: CUSTOM

## 2023-10-30 ENCOUNTER — APPOINTMENT (OUTPATIENT)
Dept: PHARMACY | Facility: CLINIC | Age: 83
End: 2023-10-30
Payer: SELF-PAY

## 2023-10-30 PROCEDURE — V5299A: CUSTOM

## 2023-11-29 ENCOUNTER — NON-APPOINTMENT (OUTPATIENT)
Age: 83
End: 2023-11-29

## 2024-01-04 ENCOUNTER — APPOINTMENT (OUTPATIENT)
Dept: PHARMACY | Facility: CLINIC | Age: 84
End: 2024-01-04
Payer: SELF-PAY

## 2024-01-04 PROCEDURE — V5299A: CUSTOM

## 2024-04-27 ENCOUNTER — NON-APPOINTMENT (OUTPATIENT)
Age: 84
End: 2024-04-27

## 2024-04-29 ENCOUNTER — APPOINTMENT (OUTPATIENT)
Dept: PHARMACY | Facility: CLINIC | Age: 84
End: 2024-04-29
Payer: SELF-PAY

## 2024-04-29 PROCEDURE — V5299A: CUSTOM

## 2024-05-01 ENCOUNTER — APPOINTMENT (OUTPATIENT)
Dept: PHARMACY | Facility: CLINIC | Age: 84
End: 2024-05-01
Payer: SELF-PAY

## 2024-05-01 PROCEDURE — V5299A: CUSTOM

## 2024-05-02 ENCOUNTER — APPOINTMENT (OUTPATIENT)
Dept: PHARMACY | Facility: CLINIC | Age: 84
End: 2024-05-02
Payer: SELF-PAY

## 2024-05-02 PROCEDURE — V5299A: CUSTOM | Mod: RT

## 2024-05-16 ENCOUNTER — APPOINTMENT (OUTPATIENT)
Dept: PHARMACY | Facility: CLINIC | Age: 84
End: 2024-05-16
Payer: SELF-PAY

## 2024-05-16 PROCEDURE — V5299A: CUSTOM | Mod: RT

## 2024-06-20 ENCOUNTER — NON-APPOINTMENT (OUTPATIENT)
Age: 84
End: 2024-06-20

## 2024-09-03 ENCOUNTER — APPOINTMENT (OUTPATIENT)
Dept: PHARMACY | Facility: CLINIC | Age: 84
End: 2024-09-03
Payer: SELF-PAY

## 2024-09-03 ENCOUNTER — APPOINTMENT (OUTPATIENT)
Dept: OTOLARYNGOLOGY | Facility: CLINIC | Age: 84
End: 2024-09-03
Payer: MEDICARE

## 2024-09-03 VITALS
WEIGHT: 180 LBS | HEART RATE: 75 BPM | HEIGHT: 65 IN | BODY MASS INDEX: 29.99 KG/M2 | DIASTOLIC BLOOD PRESSURE: 75 MMHG | SYSTOLIC BLOOD PRESSURE: 163 MMHG

## 2024-09-03 DIAGNOSIS — H90.3 SENSORINEURAL HEARING LOSS, BILATERAL: ICD-10-CM

## 2024-09-03 PROCEDURE — V5299A: CUSTOM

## 2024-09-03 PROCEDURE — 92567 TYMPANOMETRY: CPT

## 2024-09-03 PROCEDURE — 92557 COMPREHENSIVE HEARING TEST: CPT

## 2024-09-03 PROCEDURE — 92504 EAR MICROSCOPY EXAMINATION: CPT

## 2024-09-03 PROCEDURE — 99213 OFFICE O/P EST LOW 20 MIN: CPT

## 2024-09-03 NOTE — REASON FOR VISIT
[Subsequent Evaluation] : a subsequent evaluation for [Family Member] : family member [FreeTextEntry2] : bilateral SNHL

## 2024-09-03 NOTE — HISTORY OF PRESENT ILLNESS
[de-identified] : 84 year old woman presents for yearly follow-up for sade SNHL. Currently using bilateral hearing aids--working well as per patient.  Patient denies otalgia, otorrhea, recent fevers or ear infections, tinnitus, dizziness, vertigo, headaches related to hearing.

## 2024-09-03 NOTE — PHYSICAL EXAM
[Midline] : trachea located in midline position [Binocular Microscopic Exam] : Binocular microscopic exam was performed [Hearing Bear Test (Tuning Fork On Forehead)] : no lateralization of tone [Normal] : gait was normal [Hearing Loss Right Only] : normal [Hearing Loss Left Only] : normal [Rinne Test Air Conduction Persists > Bone Conduction Right] : bone conduction greater than air conduction on the right [Rinne Test Air Conduction Persists > Bone Conduction Left] : bone conduction greater than air conduction on the left [Nystagmus] : ~T no ~M nystagmus was seen [Fukuda Step Test] : Fukuda Step Test was Negative [Romberg's Sign] : Romberg's sign was absent [Fistula Sign] : Fistula Sign: Negative [Past-Pointing] : Past-Pointing: Negative [Kal-Hallelsieke] : Colby-Hallpike: Negative [FreeTextEntry8] : mild cerumen, removed

## 2024-09-03 NOTE — DATA REVIEWED
[de-identified] : An audiogram was ordered and performed including pure tones, tympanometry and speech testing for the patients complaint of hearing loss I have independently reviewed the patient's audiogram from today and my findings include sade SNHL, similar to prior year, asymmetric SDS

## 2024-12-26 ENCOUNTER — APPOINTMENT (OUTPATIENT)
Dept: PHARMACY | Facility: CLINIC | Age: 84
End: 2024-12-26
Payer: SELF-PAY

## 2024-12-26 PROCEDURE — V5299A: CUSTOM

## 2024-12-30 ENCOUNTER — APPOINTMENT (OUTPATIENT)
Dept: PHARMACY | Facility: CLINIC | Age: 84
End: 2024-12-30

## 2025-03-10 ENCOUNTER — APPOINTMENT (OUTPATIENT)
Dept: PHARMACY | Facility: CLINIC | Age: 85
End: 2025-03-10
Payer: SELF-PAY

## 2025-03-10 PROCEDURE — V5299A: CUSTOM

## 2025-05-18 ENCOUNTER — NON-APPOINTMENT (OUTPATIENT)
Age: 85
End: 2025-05-18

## 2025-06-25 NOTE — PACU DISCHARGE NOTE - MENTAL STATUS: PATIENT PARTICIPATION
June 25, 2025      Lew Fox  [address]  [place] [address]        Dear Lew,    I care about your health and have reviewed your health plan. I have reviewed your medical conditions, medication list, and lab results and am making recommendations based on this review, to better manage your health.    You are in particular need of attention regarding:  -Wellness (Physical) Visit  Well Child Check    I am recommending that you:  -schedule a WELLNESS (Physical) APPOINTMENT with me.   I will check fasting labs the same day - nothing to eat except water and meds for 8-10 hours prior.      Here is a list of Health Maintenance topics that are due now or due soon:  Health Maintenance Due   Topic Date Due    ANNUAL REVIEW OF HM ORDERS  Never done    Chlamydia Screening  Never done    COVID-19 Vaccine (1 - 2024-25 season) Never done    PHQ-2 (once per calendar year)  01/01/2025    Yearly Preventive Visit  01/15/2025    HIV Screening  08/31/2024       Please call us at [phone] (or use [employer]) to address the above recommendations.     Thank you for trusting [facility] and we appreciate the opportunity to serve you.  We look forward to supporting your healthcare needs in the future.    Healthy Regards,    Mai Miller PA-C               [facility]

## 2025-07-07 ENCOUNTER — APPOINTMENT (OUTPATIENT)
Dept: PHARMACY | Facility: CLINIC | Age: 85
End: 2025-07-07
Payer: SELF-PAY

## 2025-07-07 PROCEDURE — V5299A: CUSTOM | Mod: NC

## 2025-07-17 RX ORDER — POLYMYXIN B SULFATE AND TRIMETHOPRIM SULFATE 10000; 1 [IU]/ML; MG/ML
10000-0.1 SOLUTION/ DROPS OPHTHALMIC
Qty: 1 | Refills: 0 | Status: ACTIVE | COMMUNITY
Start: 2025-07-17 | End: 1900-01-01

## 2025-07-18 ENCOUNTER — APPOINTMENT (OUTPATIENT)
Dept: PULMONOLOGY | Facility: CLINIC | Age: 85
End: 2025-07-18
Payer: MEDICARE

## 2025-07-18 VITALS
WEIGHT: 171 LBS | HEART RATE: 95 BPM | DIASTOLIC BLOOD PRESSURE: 77 MMHG | BODY MASS INDEX: 28.49 KG/M2 | HEIGHT: 65 IN | SYSTOLIC BLOOD PRESSURE: 154 MMHG | OXYGEN SATURATION: 96 %

## 2025-07-18 PROBLEM — C50.911 MALIGNANT NEOPLASM OF RIGHT FEMALE BREAST, UNSPECIFIED ESTROGEN RECEPTOR STATUS, UNSPECIFIED SITE OF BREAST: Status: ACTIVE | Noted: 2025-07-18

## 2025-07-18 LAB — POCT - HEMOGLOBIN (HGB), QUANTITATIVE, TRANSCUTANEOUS: 11.7

## 2025-07-18 PROCEDURE — 94729 DIFFUSING CAPACITY: CPT

## 2025-07-18 PROCEDURE — 99205 OFFICE O/P NEW HI 60 MIN: CPT | Mod: 25

## 2025-07-18 PROCEDURE — 94727 GAS DIL/WSHOT DETER LNG VOL: CPT

## 2025-07-18 PROCEDURE — 94060 EVALUATION OF WHEEZING: CPT

## 2025-07-18 PROCEDURE — 71046 X-RAY EXAM CHEST 2 VIEWS: CPT

## 2025-07-18 PROCEDURE — ZZZZZ: CPT

## 2025-07-18 PROCEDURE — 88738 HGB QUANT TRANSCUTANEOUS: CPT

## 2025-07-18 RX ORDER — PREDNISONE 10 MG/1
10 TABLET ORAL
Qty: 30 | Refills: 0 | Status: ACTIVE | COMMUNITY
Start: 2025-07-18 | End: 1900-01-01

## 2025-07-18 RX ORDER — ANASTROZOLE TABLETS 1 MG/1
1 TABLET ORAL
Refills: 0 | Status: ACTIVE | COMMUNITY

## 2025-07-18 RX ORDER — FLUTICASONE FUROATE, UMECLIDINIUM BROMIDE AND VILANTEROL TRIFENATATE 100; 62.5; 25 UG/1; UG/1; UG/1
100-62.5-25 POWDER RESPIRATORY (INHALATION)
Refills: 0 | Status: ACTIVE | COMMUNITY

## 2025-07-19 PROBLEM — J18.9 PNEUMONIA OF LEFT LOWER LOBE DUE TO INFECTIOUS ORGANISM: Status: ACTIVE | Noted: 2025-07-19

## 2025-08-08 ENCOUNTER — APPOINTMENT (OUTPATIENT)
Dept: PULMONOLOGY | Facility: CLINIC | Age: 85
End: 2025-08-08
Payer: MEDICARE

## 2025-08-08 VITALS
WEIGHT: 171 LBS | SYSTOLIC BLOOD PRESSURE: 149 MMHG | DIASTOLIC BLOOD PRESSURE: 74 MMHG | HEIGHT: 65 IN | TEMPERATURE: 98 F | BODY MASS INDEX: 28.49 KG/M2 | RESPIRATION RATE: 18 BRPM | OXYGEN SATURATION: 96 % | HEART RATE: 90 BPM

## 2025-08-08 DIAGNOSIS — K21.9 GASTRO-ESOPHAGEAL REFLUX DISEASE W/OUT ESOPHAGITIS: ICD-10-CM

## 2025-08-08 DIAGNOSIS — R05.3 CHRONIC COUGH: ICD-10-CM

## 2025-08-08 DIAGNOSIS — J98.4 OTHER DISORDERS OF LUNG: ICD-10-CM

## 2025-08-08 PROCEDURE — G2211 COMPLEX E/M VISIT ADD ON: CPT

## 2025-08-08 PROCEDURE — 99213 OFFICE O/P EST LOW 20 MIN: CPT

## 2025-08-08 RX ORDER — FLUTICASONE FUROATE, UMECLIDINIUM BROMIDE AND VILANTEROL TRIFENATATE 100; 62.5; 25 UG/1; UG/1; UG/1
100-62.5-25 POWDER RESPIRATORY (INHALATION)
Qty: 1 | Refills: 5 | Status: ACTIVE | COMMUNITY
Start: 2025-08-08 | End: 1900-01-01

## 2025-09-09 ENCOUNTER — APPOINTMENT (OUTPATIENT)
Dept: PHARMACY | Facility: CLINIC | Age: 85
End: 2025-09-09
Payer: SELF-PAY

## 2025-09-09 ENCOUNTER — APPOINTMENT (OUTPATIENT)
Dept: OTOLARYNGOLOGY | Facility: CLINIC | Age: 85
End: 2025-09-09
Payer: MEDICARE

## 2025-09-09 VITALS
BODY MASS INDEX: 29.16 KG/M2 | HEART RATE: 80 BPM | DIASTOLIC BLOOD PRESSURE: 84 MMHG | HEIGHT: 65 IN | SYSTOLIC BLOOD PRESSURE: 154 MMHG | WEIGHT: 175 LBS

## 2025-09-09 DIAGNOSIS — H90.3 SENSORINEURAL HEARING LOSS, BILATERAL: ICD-10-CM

## 2025-09-09 PROCEDURE — 92504 EAR MICROSCOPY EXAMINATION: CPT

## 2025-09-09 PROCEDURE — 92567 TYMPANOMETRY: CPT

## 2025-09-09 PROCEDURE — 99214 OFFICE O/P EST MOD 30 MIN: CPT

## 2025-09-09 PROCEDURE — V5299A: CUSTOM

## 2025-09-09 PROCEDURE — 92557 COMPREHENSIVE HEARING TEST: CPT
